# Patient Record
Sex: FEMALE | Race: WHITE | NOT HISPANIC OR LATINO | Employment: OTHER | URBAN - METROPOLITAN AREA
[De-identification: names, ages, dates, MRNs, and addresses within clinical notes are randomized per-mention and may not be internally consistent; named-entity substitution may affect disease eponyms.]

---

## 2019-04-03 PROBLEM — 414916001 OBESITY: Status: ACTIVE | Noted: 2019-04-03

## 2019-04-03 PROBLEM — 40739000 DYSPHAGIA: Status: ACTIVE | Noted: 2019-04-03

## 2019-04-03 PROBLEM — 39839004 DIAPHRAGMATIC HERNIA: Status: ACTIVE | Noted: 2019-04-03

## 2022-04-30 ENCOUNTER — TELEPHONE ENCOUNTER (OUTPATIENT)
Dept: URBAN - METROPOLITAN AREA CLINIC 121 | Facility: CLINIC | Age: 77
End: 2022-04-30

## 2022-04-30 RX ORDER — TEMAZEPAM 15 MG/1
PRN CAPSULE ORAL
OUTPATIENT
Start: 2014-03-10 | End: 2016-04-08

## 2022-04-30 RX ORDER — DEXLANSOPRAZOLE 60 MG/1
QD CAPSULE, DELAYED RELEASE ORAL
OUTPATIENT
Start: 2014-03-10 | End: 2019-04-03

## 2022-04-30 RX ORDER — POLYETHYLENE GLYCOL 3350, SODIUM SULFATE, SODIUM CHLORIDE, POTASSIUM CHLORIDE, ASCORBIC ACID, SODIUM ASCORBATE 7.5-2.691G
MIX AND USE AS DIRECTED KIT ORAL
OUTPATIENT
Start: 2014-03-10

## 2022-04-30 RX ORDER — ESTRADIOL 10 UG/1
2 TIMES A WEEK INSERT VAGINAL
OUTPATIENT
Start: 2014-03-10 | End: 2019-04-03

## 2022-04-30 RX ORDER — MAXZIDE 37.5; 25 MG/1; MG/1
TABLET ORAL
OUTPATIENT
Start: 2016-04-08

## 2022-04-30 RX ORDER — ESTRADIOL 10 UG/1
2 TIMES A WEEK INSERT VAGINAL
OUTPATIENT
Start: 2014-03-10

## 2022-04-30 RX ORDER — ALBUTEROL SULFATE 90 UG/1
PRN AEROSOL, METERED RESPIRATORY (INHALATION)
OUTPATIENT
Start: 2014-03-10 | End: 2016-04-08

## 2022-04-30 RX ORDER — TEMAZEPAM 15 MG/1
PRN CAPSULE ORAL
OUTPATIENT
Start: 2014-03-10

## 2022-04-30 RX ORDER — MAXZIDE 37.5; 25 MG/1; MG/1
TABLET ORAL
OUTPATIENT
Start: 2016-04-08 | End: 2019-04-03

## 2022-04-30 RX ORDER — POLYETHYLENE GLYCOL 3350, SODIUM SULFATE, SODIUM CHLORIDE, POTASSIUM CHLORIDE, ASCORBIC ACID, SODIUM ASCORBATE 7.5-2.691G
MIX AND USE AS DIRECTED KIT ORAL
OUTPATIENT
Start: 2014-03-10 | End: 2016-04-08

## 2022-04-30 RX ORDER — LEVOTHYROXINE SODIUM 125 MCG
TABLET ORAL
OUTPATIENT
Start: 2014-03-05

## 2022-04-30 RX ORDER — ALBUTEROL SULFATE 90 UG/1
PRN AEROSOL, METERED RESPIRATORY (INHALATION)
OUTPATIENT
Start: 2014-03-10

## 2022-04-30 RX ORDER — DEXLANSOPRAZOLE 60 MG/1
QD CAPSULE, DELAYED RELEASE ORAL
OUTPATIENT
Start: 2014-03-10

## 2022-05-01 ENCOUNTER — TELEPHONE ENCOUNTER (OUTPATIENT)
Dept: URBAN - METROPOLITAN AREA CLINIC 121 | Facility: CLINIC | Age: 77
End: 2022-05-01

## 2022-05-01 RX ORDER — LOSARTAN POTASSIUM 25 MG/1
QD TABLET, FILM COATED ORAL
Status: ACTIVE | COMMUNITY
Start: 2014-03-10

## 2022-05-01 RX ORDER — LEVOTHYROXINE SODIUM 100 MCG
QD TABLET ORAL
Status: ACTIVE | COMMUNITY
Start: 2014-03-10

## 2022-05-01 RX ORDER — MONTELUKAST SODIUM 10 MG/1
TABLET, FILM COATED ORAL
Status: ACTIVE | COMMUNITY
Start: 2016-04-08

## 2022-05-01 RX ORDER — ALBUTEROL SULFATE 90 UG/1
INHALANT RESPIRATORY (INHALATION)
Status: ACTIVE | COMMUNITY
Start: 2019-04-03

## 2022-05-01 RX ORDER — ESTRADIOL 10 UG/1
INSERT VAGINAL
Status: ACTIVE | COMMUNITY
Start: 2019-04-03

## 2022-05-01 RX ORDER — IRBESARTAN 75 MG/1
TABLET ORAL
Status: ACTIVE | COMMUNITY
Start: 2019-04-03

## 2022-05-01 RX ORDER — ELECTROLYTES/DEXTROSE
SOLUTION, ORAL ORAL
Status: ACTIVE | COMMUNITY
Start: 2016-04-08

## 2022-05-01 RX ORDER — MULTIVIT-MIN/FOLIC/VIT K/LYCOP 400-300MCG
TABLET ORAL
Status: ACTIVE | COMMUNITY
Start: 2016-04-08

## 2024-04-17 ENCOUNTER — OV CON (OUTPATIENT)
Dept: URBAN - METROPOLITAN AREA CLINIC 27 | Facility: CLINIC | Age: 79
End: 2024-04-17
Payer: COMMERCIAL

## 2024-04-17 ENCOUNTER — LAB (OUTPATIENT)
Dept: URBAN - METROPOLITAN AREA CLINIC 27 | Facility: CLINIC | Age: 79
End: 2024-04-17

## 2024-04-17 VITALS
DIASTOLIC BLOOD PRESSURE: 88 MMHG | HEIGHT: 62 IN | WEIGHT: 193 LBS | SYSTOLIC BLOOD PRESSURE: 168 MMHG | HEART RATE: 63 BPM | BODY MASS INDEX: 35.51 KG/M2

## 2024-04-17 DIAGNOSIS — R13.19 ESOPHAGEAL DYSPHAGIA: ICD-10-CM

## 2024-04-17 DIAGNOSIS — Z86.010 PERSONAL HISTORY OF COLONIC POLYPS: ICD-10-CM

## 2024-04-17 PROCEDURE — 99214 OFFICE O/P EST MOD 30 MIN: CPT | Performed by: INTERNAL MEDICINE

## 2024-04-17 RX ORDER — MONTELUKAST SODIUM 10 MG/1
TABLET, FILM COATED ORAL
Status: ACTIVE | COMMUNITY
Start: 2016-04-08

## 2024-04-17 RX ORDER — ELECTROLYTES/DEXTROSE
SOLUTION, ORAL ORAL
Status: ACTIVE | COMMUNITY
Start: 2016-04-08

## 2024-04-17 RX ORDER — MULTIVIT-MIN/FOLIC/VIT K/LYCOP 400-300MCG
TABLET ORAL
Status: ACTIVE | COMMUNITY
Start: 2016-04-08

## 2024-04-17 RX ORDER — ALBUTEROL SULFATE 90 UG/1
INHALANT RESPIRATORY (INHALATION)
Status: ACTIVE | COMMUNITY
Start: 2019-04-03

## 2024-04-17 RX ORDER — ESTRADIOL 10 UG/1
INSERT VAGINAL
Status: ACTIVE | COMMUNITY
Start: 2019-04-03

## 2024-04-17 RX ORDER — LEVOTHYROXINE SODIUM 100 MCG
QD TABLET ORAL
Status: ACTIVE | COMMUNITY
Start: 2014-03-10

## 2024-04-17 RX ORDER — LOSARTAN POTASSIUM 25 MG/1
QD TABLET, FILM COATED ORAL
Status: ACTIVE | COMMUNITY
Start: 2014-03-10

## 2024-04-17 RX ORDER — IRBESARTAN 75 MG/1
TABLET ORAL
Status: ACTIVE | COMMUNITY
Start: 2019-04-03

## 2024-04-17 NOTE — HPI-TODAY'S VISIT:
78 old female here for repeat colonoscopy and issues with swallowing.  She had 2 surgeries in the last several years, 1 hiatal hernia repair and 2 goiter surgery.  Immediately after the hiatal hernia repair, she had frequent issues with food getting stuck in her lower chest and then having to vomit them up.  That has resolved, but more recently has had issues with food and pills getting stuck in her upper chest area.  Denies weight loss, melena.  No heartburn symptoms since her hiatal hernia repair.  Last colonoscopy was 2019 at which time she had 2 polyps removed.

## 2024-05-02 ENCOUNTER — TELEPHONE ENCOUNTER (OUTPATIENT)
Dept: URBAN - METROPOLITAN AREA CLINIC 27 | Facility: CLINIC | Age: 79
End: 2024-05-02

## 2024-05-03 ENCOUNTER — OFFICE VISIT (OUTPATIENT)
Dept: URBAN - METROPOLITAN AREA SURGERY CENTER 7 | Facility: SURGERY CENTER | Age: 79
End: 2024-05-03

## 2024-05-03 PROCEDURE — 45380 COLONOSCOPY AND BIOPSY: CPT | Performed by: INTERNAL MEDICINE

## 2024-05-03 PROCEDURE — 45386 COLONOSCOPY W/BALLOON DILAT: CPT | Performed by: INTERNAL MEDICINE

## 2024-05-20 ENCOUNTER — OFFICE VISIT (OUTPATIENT)
Dept: URBAN - METROPOLITAN AREA SURGERY CENTER 7 | Facility: SURGERY CENTER | Age: 79
End: 2024-05-20
Payer: MEDICARE

## 2024-05-20 ENCOUNTER — CLAIMS CREATED FROM THE CLAIM WINDOW (OUTPATIENT)
Dept: URBAN - METROPOLITAN AREA CLINIC 4 | Facility: CLINIC | Age: 79
End: 2024-05-20
Payer: MEDICARE

## 2024-05-20 DIAGNOSIS — R13.19 CERVICAL DYSPHAGIA: ICD-10-CM

## 2024-05-20 DIAGNOSIS — K21.9 ACID REFLUX: ICD-10-CM

## 2024-05-20 DIAGNOSIS — K21.9 GASTRIC REFLUX: ICD-10-CM

## 2024-05-20 DIAGNOSIS — R13.10 DYSPHAGIA: ICD-10-CM

## 2024-05-20 DIAGNOSIS — Z12.11 COLON CANCER SCREENING: ICD-10-CM

## 2024-05-20 DIAGNOSIS — K21.9 GASTRO-ESOPHAGEAL REFLUX DISEASE WITHOUT ESOPHAGITIS: ICD-10-CM

## 2024-05-20 DIAGNOSIS — K22.9 ABNORMALITY OF ESOPHAGUS: ICD-10-CM

## 2024-05-20 DIAGNOSIS — Z86.010 PERSONAL HISTORY OF COLON POLYPS: ICD-10-CM

## 2024-05-20 DIAGNOSIS — K57.30 DIVERTICULOSIS OF SIGMOID COLON: ICD-10-CM

## 2024-05-20 PROCEDURE — 43450 DILATE ESOPHAGUS 1/MULT PASS: CPT | Performed by: INTERNAL MEDICINE

## 2024-05-20 PROCEDURE — 00813 ANES UPR LWR GI NDSC PX: CPT | Performed by: ANESTHESIOLOGY

## 2024-05-20 PROCEDURE — 43239 EGD BIOPSY SINGLE/MULTIPLE: CPT | Performed by: INTERNAL MEDICINE

## 2024-05-20 PROCEDURE — 88305 TISSUE EXAM BY PATHOLOGIST: CPT | Performed by: PATHOLOGY

## 2024-05-20 PROCEDURE — G0121 COLON CA SCRN NOT HI RSK IND: HCPCS | Performed by: INTERNAL MEDICINE

## 2024-05-20 PROCEDURE — 88312 SPECIAL STAINS GROUP 1: CPT | Performed by: PATHOLOGY

## 2024-05-20 PROCEDURE — G8907 PT DOC NO EVENTS ON DISCHARG: HCPCS | Performed by: INTERNAL MEDICINE

## 2024-05-20 RX ORDER — MULTIVIT-MIN/FOLIC/VIT K/LYCOP 400-300MCG
TABLET ORAL
Status: ACTIVE | COMMUNITY
Start: 2016-04-08

## 2024-05-20 RX ORDER — LOSARTAN POTASSIUM 25 MG/1
QD TABLET, FILM COATED ORAL
Status: ACTIVE | COMMUNITY
Start: 2014-03-10

## 2024-05-20 RX ORDER — IRBESARTAN 75 MG/1
TABLET ORAL
Status: ACTIVE | COMMUNITY
Start: 2019-04-03

## 2024-05-20 RX ORDER — ALBUTEROL SULFATE 90 UG/1
INHALANT RESPIRATORY (INHALATION)
Status: ACTIVE | COMMUNITY
Start: 2019-04-03

## 2024-05-20 RX ORDER — ELECTROLYTES/DEXTROSE
SOLUTION, ORAL ORAL
Status: ACTIVE | COMMUNITY
Start: 2016-04-08

## 2024-05-20 RX ORDER — MONTELUKAST SODIUM 10 MG/1
TABLET, FILM COATED ORAL
Status: ACTIVE | COMMUNITY
Start: 2016-04-08

## 2024-05-20 RX ORDER — LEVOTHYROXINE SODIUM 100 MCG
QD TABLET ORAL
Status: ACTIVE | COMMUNITY
Start: 2014-03-10

## 2024-05-20 RX ORDER — ESTRADIOL 10 UG/1
INSERT VAGINAL
Status: ACTIVE | COMMUNITY
Start: 2019-04-03